# Patient Record
Sex: FEMALE | Race: WHITE | NOT HISPANIC OR LATINO
[De-identification: names, ages, dates, MRNs, and addresses within clinical notes are randomized per-mention and may not be internally consistent; named-entity substitution may affect disease eponyms.]

---

## 2017-02-07 ENCOUNTER — APPOINTMENT (OUTPATIENT)
Dept: INTERNAL MEDICINE | Facility: CLINIC | Age: 67
End: 2017-02-07

## 2017-08-07 ENCOUNTER — APPOINTMENT (OUTPATIENT)
Dept: INTERNAL MEDICINE | Facility: CLINIC | Age: 67
End: 2017-08-07
Payer: MEDICARE

## 2017-08-07 PROCEDURE — 99214 OFFICE O/P EST MOD 30 MIN: CPT | Mod: 25

## 2017-08-07 PROCEDURE — 90670 PCV13 VACCINE IM: CPT

## 2017-08-07 PROCEDURE — G0009: CPT

## 2017-11-07 ENCOUNTER — APPOINTMENT (OUTPATIENT)
Dept: INTERNAL MEDICINE | Facility: CLINIC | Age: 67
End: 2017-11-07

## 2017-12-27 ENCOUNTER — RECORD ABSTRACTING (OUTPATIENT)
Age: 67
End: 2017-12-27

## 2017-12-27 DIAGNOSIS — I10 ESSENTIAL (PRIMARY) HYPERTENSION: ICD-10-CM

## 2017-12-27 DIAGNOSIS — Z78.9 OTHER SPECIFIED HEALTH STATUS: ICD-10-CM

## 2017-12-27 DIAGNOSIS — E78.5 HYPERLIPIDEMIA, UNSPECIFIED: ICD-10-CM

## 2017-12-27 DIAGNOSIS — Z82.5 FAMILY HISTORY OF ASTHMA AND OTHER CHRONIC LOWER RESPIRATORY DISEASES: ICD-10-CM

## 2017-12-27 DIAGNOSIS — Z82.49 FAMILY HISTORY OF ISCHEMIC HEART DISEASE AND OTHER DISEASES OF THE CIRCULATORY SYSTEM: ICD-10-CM

## 2017-12-27 DIAGNOSIS — I38 ENDOCARDITIS, VALVE UNSPECIFIED: ICD-10-CM

## 2017-12-27 DIAGNOSIS — Z85.850 PERSONAL HISTORY OF MALIGNANT NEOPLASM OF THYROID: ICD-10-CM

## 2017-12-27 DIAGNOSIS — K66.0 PERITONEAL ADHESIONS (POSTPROCEDURAL) (POSTINFECTION): ICD-10-CM

## 2017-12-27 DIAGNOSIS — E66.9 OBESITY, UNSPECIFIED: ICD-10-CM

## 2017-12-27 RX ORDER — GABAPENTIN 600 MG/1
600 TABLET, FILM COATED ORAL
Refills: 0 | Status: ACTIVE | COMMUNITY

## 2017-12-27 RX ORDER — DULOXETINE HYDROCHLORIDE 60 MG/1
60 CAPSULE, DELAYED RELEASE ORAL
Refills: 0 | Status: ACTIVE | COMMUNITY

## 2017-12-27 RX ORDER — LEVOTHYROXINE SODIUM 0.17 MG/1
175 TABLET ORAL
Refills: 0 | Status: ACTIVE | COMMUNITY

## 2017-12-27 RX ORDER — ACETAMINOPHEN 325 MG/1
TABLET, FILM COATED ORAL
Refills: 0 | Status: ACTIVE | COMMUNITY

## 2017-12-27 RX ORDER — DIAPER,BRIEF,INFANT-TODD,DISP
EACH MISCELLANEOUS
Refills: 0 | Status: ACTIVE | COMMUNITY

## 2017-12-27 RX ORDER — AMLODIPINE BESYLATE 10 MG/1
10 TABLET ORAL
Refills: 0 | Status: ACTIVE | COMMUNITY

## 2017-12-27 RX ORDER — DENOSUMAB 60 MG/ML
INJECTION SUBCUTANEOUS
Refills: 0 | Status: ACTIVE | COMMUNITY

## 2017-12-27 RX ORDER — ALPRAZOLAM 2 MG/1
TABLET ORAL
Refills: 0 | Status: ACTIVE | COMMUNITY

## 2017-12-27 RX ORDER — DULOXETINE HYDROCHLORIDE 30 MG/1
30 CAPSULE, DELAYED RELEASE ORAL
Refills: 0 | Status: ACTIVE | COMMUNITY

## 2017-12-27 RX ORDER — GABAPENTIN 300 MG/1
300 CAPSULE ORAL
Refills: 0 | Status: ACTIVE | COMMUNITY

## 2018-01-10 ENCOUNTER — APPOINTMENT (OUTPATIENT)
Dept: INTERNAL MEDICINE | Facility: CLINIC | Age: 68
End: 2018-01-10
Payer: MEDICARE

## 2018-01-10 PROCEDURE — 90471 IMMUNIZATION ADMIN: CPT | Mod: GY

## 2018-01-10 PROCEDURE — 36415 COLL VENOUS BLD VENIPUNCTURE: CPT

## 2018-01-10 PROCEDURE — 90715 TDAP VACCINE 7 YRS/> IM: CPT | Mod: GY

## 2018-01-10 PROCEDURE — 99215 OFFICE O/P EST HI 40 MIN: CPT | Mod: 25

## 2018-01-23 ENCOUNTER — APPOINTMENT (OUTPATIENT)
Dept: CARDIOLOGY | Facility: CLINIC | Age: 68
End: 2018-01-23

## 2018-03-15 ENCOUNTER — RX RENEWAL (OUTPATIENT)
Age: 68
End: 2018-03-15

## 2018-03-15 ENCOUNTER — MEDICATION RENEWAL (OUTPATIENT)
Age: 68
End: 2018-03-15

## 2018-03-15 RX ORDER — ENALAPRIL MALEATE 20 MG/1
20 TABLET ORAL
Qty: 90 | Refills: 3 | Status: ACTIVE | COMMUNITY
Start: 1900-01-01 | End: 1900-01-01

## 2018-05-01 ENCOUNTER — RX RENEWAL (OUTPATIENT)
Age: 68
End: 2018-05-01

## 2018-05-01 RX ORDER — METOPROLOL SUCCINATE 200 MG/1
200 TABLET, EXTENDED RELEASE ORAL
Qty: 180 | Refills: 0 | Status: ACTIVE | COMMUNITY
Start: 2018-05-01 | End: 1900-01-01

## 2025-02-11 ENCOUNTER — EMERGENCY (EMERGENCY)
Facility: HOSPITAL | Age: 75
LOS: 1 days | Discharge: OTHER INSTITUTION | End: 2025-02-11
Attending: EMERGENCY MEDICINE
Payer: MEDICARE

## 2025-02-11 VITALS
SYSTOLIC BLOOD PRESSURE: 147 MMHG | OXYGEN SATURATION: 100 % | RESPIRATION RATE: 18 BRPM | HEART RATE: 75 BPM | TEMPERATURE: 98 F | DIASTOLIC BLOOD PRESSURE: 80 MMHG

## 2025-02-11 PROCEDURE — 99285 EMERGENCY DEPT VISIT HI MDM: CPT

## 2025-02-11 NOTE — ED PROVIDER NOTE - NSICDXPASTMEDICALHX_GEN_ALL_CORE_FT
PAST MEDICAL HISTORY:  Anxiety     Depression     Fibromyalgia     [place] (hard of hearing), bilateral     Hypertension     Hypothyroid     Osteopenia     PTSD (post-traumatic stress disorder)     Thyroid cancer

## 2025-02-11 NOTE — ED ADULT TRIAGE NOTE - CHIEF COMPLAINT QUOTE
Pt BIBEMS from [facility] for medical eval for [facility]. Pt is severely depressed and was recently at an outpatient place in [place], unhappy with car, was being registered at [facility] when eluded to bloody BMs 2 days ago. Pt endorses clear BMs and denies bloody. Pt endorses hemorrhoids.

## 2025-02-11 NOTE — ED PROVIDER NOTE - CLINICAL SUMMARY MEDICAL DECISION MAKING FREE TEXT BOX
patient presenting for medical clearance for [facility].  Has not had any bloody bowel movements in over 24 hours.  Patient asymptomatic in ED with stable vital signs.  Will obtain labs for clearance.  The patient remains asymptomatic with normal labs we will call [facility] () for possible admission.

## 2025-02-11 NOTE — ED PROVIDER NOTE - OBJECTIVE STATEMENT
74-year-old female h/o MDD, ANJELICA, hypothyroidism, Ménière's disease, hypertension, fibromyalgia, ambulates with a walker presenting from [facility] for medical clearance.  Patient recently admitted in [place] for anxiety/depression, presented to [facility] today for admission.  Told the staff at [facility] that she had diarrhea the last couple days that has resolved today, has a known history of hemorrhoids and had some blood in the stool 2 days ago.  Has not seen any blood since then.  Was sent to ED for medical clearance.  Denies fever, abdominal pain or additional symptoms.  States she has increasing depression and has had some suicidal ideation without any specific plan.

## 2025-02-11 NOTE — ED PROVIDER NOTE - NSICDXPASTSURGICALHX_GEN_ALL_CORE_FT
PAST SURGICAL HISTORY:  Family hx total abdominal hysterectomy/bilateral salpingo-oophorectomy 1989    History of appendectomy     History of arthroscopy of right knee 2009    History of carpal tunnel release left wrist 2005    History of laparoscopy 3 times for adhesions    History of thyroidectomy right side 1993 left 2013    History of total knee replacement, right 2010    S/P cholecystectomy 1996

## 2025-02-11 NOTE — ED PROVIDER NOTE - PROGRESS NOTE DETAILS
Jean Pierre AGUIRRE: Discussed with Dr. Muhammad at [facility], patient has not yet been accepted or admitted to [facility], states that patient will require psychiatric evaluation before being accepted to [facility].  Patient is medically cleared, I called telepsych for evaluation

## 2025-02-11 NOTE — ED PROVIDER NOTE - NSICDXFAMILYHX_GEN_ALL_CORE_FT
FAMILY HISTORY:  Father  Still living? No  Family history of COPD (chronic obstructive pulmonary disease), Age at diagnosis: Age Unknown    Mother  Still living? No  Family history of brain cancer, Age at diagnosis: Age Unknown

## 2025-02-12 LAB
ALBUMIN SERPL ELPH-MCNC: 4 G/DL — SIGNIFICANT CHANGE UP (ref 3.3–5.2)
ALP SERPL-CCNC: 94 U/L — SIGNIFICANT CHANGE UP (ref 40–120)
ALT FLD-CCNC: 18 U/L — SIGNIFICANT CHANGE UP
AMORPH CRY # UR COMP ASSIST: PRESENT
AMPHET UR-MCNC: NEGATIVE — SIGNIFICANT CHANGE UP
ANION GAP SERPL CALC-SCNC: 13 MMOL/L — SIGNIFICANT CHANGE UP (ref 5–17)
APAP SERPL-MCNC: <3 UG/ML — LOW (ref 10–26)
APPEARANCE UR: CLEAR — SIGNIFICANT CHANGE UP
AST SERPL-CCNC: 24 U/L — SIGNIFICANT CHANGE UP
BACTERIA # UR AUTO: ABNORMAL /HPF
BARBITURATES UR SCN-MCNC: NEGATIVE — SIGNIFICANT CHANGE UP
BENZODIAZ UR-MCNC: NEGATIVE — SIGNIFICANT CHANGE UP
BILIRUB SERPL-MCNC: 0.2 MG/DL — LOW (ref 0.4–2)
BILIRUB UR-MCNC: NEGATIVE — SIGNIFICANT CHANGE UP
BLD GP AB SCN SERPL QL: SIGNIFICANT CHANGE UP
BUN SERPL-MCNC: 26.1 MG/DL — HIGH (ref 8–20)
CALCIUM SERPL-MCNC: 9 MG/DL — SIGNIFICANT CHANGE UP (ref 8.4–10.5)
CAST: 5 /LPF — HIGH (ref 0–4)
CHLORIDE SERPL-SCNC: 101 MMOL/L — SIGNIFICANT CHANGE UP (ref 96–108)
CO2 SERPL-SCNC: 24 MMOL/L — SIGNIFICANT CHANGE UP (ref 22–29)
COCAINE METAB.OTHER UR-MCNC: NEGATIVE — SIGNIFICANT CHANGE UP
COD CRY URNS QL: PRESENT
COLOR SPEC: YELLOW — SIGNIFICANT CHANGE UP
CREAT SERPL-MCNC: 1.56 MG/DL — HIGH (ref 0.5–1.3)
DIFF PNL FLD: NEGATIVE — SIGNIFICANT CHANGE UP
EGFR: 35 ML/MIN/1.73M2 — LOW
ETHANOL SERPL-MCNC: <10 MG/DL — SIGNIFICANT CHANGE UP (ref 0–9)
FENTANYL UR QL SCN: NEGATIVE — SIGNIFICANT CHANGE UP
FLUAV AG NPH QL: SIGNIFICANT CHANGE UP
FLUBV AG NPH QL: SIGNIFICANT CHANGE UP
GLUCOSE SERPL-MCNC: 171 MG/DL — HIGH (ref 70–99)
GLUCOSE UR QL: NEGATIVE MG/DL — SIGNIFICANT CHANGE UP
HCT VFR BLD CALC: 44.7 % — SIGNIFICANT CHANGE UP (ref 34.5–45)
HGB BLD-MCNC: 14.8 G/DL — SIGNIFICANT CHANGE UP (ref 11.5–15.5)
KETONES UR-MCNC: ABNORMAL MG/DL
LEUKOCYTE ESTERASE UR-ACNC: NEGATIVE — SIGNIFICANT CHANGE UP
MCHC RBC-ENTMCNC: 27.8 PG — SIGNIFICANT CHANGE UP (ref 27–34)
MCHC RBC-ENTMCNC: 33.1 G/DL — SIGNIFICANT CHANGE UP (ref 32–36)
MCV RBC AUTO: 83.9 FL — SIGNIFICANT CHANGE UP (ref 80–100)
METHADONE UR-MCNC: NEGATIVE — SIGNIFICANT CHANGE UP
NITRITE UR-MCNC: NEGATIVE — SIGNIFICANT CHANGE UP
NRBC # BLD AUTO: 0 K/UL — SIGNIFICANT CHANGE UP (ref 0–0)
NRBC # FLD: 0 K/UL — SIGNIFICANT CHANGE UP (ref 0–0)
NRBC BLD AUTO-RTO: 0 /100 WBCS — SIGNIFICANT CHANGE UP (ref 0–0)
OPIATES UR-MCNC: NEGATIVE — SIGNIFICANT CHANGE UP
PCP SPEC-MCNC: SIGNIFICANT CHANGE UP
PCP UR-MCNC: NEGATIVE — SIGNIFICANT CHANGE UP
PH UR: 5.5 — SIGNIFICANT CHANGE UP (ref 5–8)
PLATELET # BLD AUTO: 328 K/UL — SIGNIFICANT CHANGE UP (ref 150–400)
PMV BLD: 10.6 FL — SIGNIFICANT CHANGE UP (ref 7–13)
POTASSIUM SERPL-MCNC: 3.2 MMOL/L — LOW (ref 3.5–5.3)
POTASSIUM SERPL-SCNC: 3.2 MMOL/L — LOW (ref 3.5–5.3)
PROT SERPL-MCNC: 6.8 G/DL — SIGNIFICANT CHANGE UP (ref 6.6–8.7)
PROT UR-MCNC: 30 MG/DL
RBC # BLD: 5.33 M/UL — HIGH (ref 3.8–5.2)
RBC # FLD: 14.1 % — SIGNIFICANT CHANGE UP (ref 10.3–14.5)
RBC CASTS # UR COMP ASSIST: 2 /HPF — SIGNIFICANT CHANGE UP (ref 0–4)
RSV RNA NPH QL NAA+NON-PROBE: SIGNIFICANT CHANGE UP
SALICYLATES SERPL-MCNC: <0.6 MG/DL — LOW (ref 10–20)
SARS-COV-2 RNA SPEC QL NAA+PROBE: SIGNIFICANT CHANGE UP
SODIUM SERPL-SCNC: 138 MMOL/L — SIGNIFICANT CHANGE UP (ref 135–145)
SP GR SPEC: 1.02 — SIGNIFICANT CHANGE UP (ref 1–1.03)
SQUAMOUS # UR AUTO: 5 /HPF — SIGNIFICANT CHANGE UP (ref 0–5)
THC UR QL: NEGATIVE — SIGNIFICANT CHANGE UP
UROBILINOGEN FLD QL: 1 MG/DL — SIGNIFICANT CHANGE UP (ref 0.2–1)
WBC # BLD: 13.86 K/UL — HIGH (ref 3.8–10.5)
WBC # FLD AUTO: 13.86 K/UL — HIGH (ref 3.8–10.5)
WBC UR QL: 1 /HPF — SIGNIFICANT CHANGE UP (ref 0–5)

## 2025-02-12 PROCEDURE — 99223 1ST HOSP IP/OBS HIGH 75: CPT

## 2025-02-12 PROCEDURE — 93010 ELECTROCARDIOGRAM REPORT: CPT | Mod: 76

## 2025-02-12 PROCEDURE — 99204 OFFICE O/P NEW MOD 45 MIN: CPT

## 2025-02-12 RX ORDER — GABAPENTIN 800 MG/1
100 TABLET ORAL THREE TIMES A DAY
Refills: 0 | Status: DISCONTINUED | OUTPATIENT
Start: 2025-02-12 | End: 2025-02-19

## 2025-02-12 RX ORDER — LEVOTHYROXINE SODIUM 25 UG/1
112 TABLET ORAL DAILY
Refills: 0 | Status: DISCONTINUED | OUTPATIENT
Start: 2025-02-12 | End: 2025-02-19

## 2025-02-12 RX ORDER — POTASSIUM CHLORIDE 750 MG/1
40 TABLET, EXTENDED RELEASE ORAL ONCE
Refills: 0 | Status: COMPLETED | OUTPATIENT
Start: 2025-02-12 | End: 2025-02-12

## 2025-02-12 RX ORDER — DULOXETINE 20 MG/1
60 CAPSULE, DELAYED RELEASE ORAL ONCE
Refills: 0 | Status: COMPLETED | OUTPATIENT
Start: 2025-02-12 | End: 2025-02-12

## 2025-02-12 RX ORDER — AMLODIPINE BESYLATE 5 MG
10 TABLET ORAL ONCE
Refills: 0 | Status: COMPLETED | OUTPATIENT
Start: 2025-02-12 | End: 2025-02-12

## 2025-02-12 RX ORDER — BACTERIOSTATIC SODIUM CHLORIDE 0.9 %
1000 VIAL (ML) INJECTION ONCE
Refills: 0 | Status: COMPLETED | OUTPATIENT
Start: 2025-02-12 | End: 2025-02-12

## 2025-02-12 RX ORDER — METOPROLOL SUCCINATE 25 MG
200 TABLET, EXTENDED RELEASE 24 HR ORAL AT BEDTIME
Refills: 0 | Status: DISCONTINUED | OUTPATIENT
Start: 2025-02-12 | End: 2025-02-19

## 2025-02-12 RX ORDER — DULOXETINE 20 MG/1
60 CAPSULE, DELAYED RELEASE ORAL
Refills: 0 | Status: DISCONTINUED | OUTPATIENT
Start: 2025-02-12 | End: 2025-02-19

## 2025-02-12 RX ADMIN — Medication 200 MILLIGRAM(S): at 23:09

## 2025-02-12 RX ADMIN — Medication 10 MILLIGRAM(S): at 20:54

## 2025-02-12 RX ADMIN — Medication 1000 MILLILITER(S): at 01:15

## 2025-02-12 RX ADMIN — LEVOTHYROXINE SODIUM 112 MICROGRAM(S): 25 TABLET ORAL at 06:39

## 2025-02-12 RX ADMIN — DULOXETINE 60 MILLIGRAM(S): 20 CAPSULE, DELAYED RELEASE ORAL at 20:56

## 2025-02-12 RX ADMIN — Medication 25 MILLIGRAM(S): at 20:55

## 2025-02-12 RX ADMIN — GABAPENTIN 100 MILLIGRAM(S): 800 TABLET ORAL at 23:09

## 2025-02-12 RX ADMIN — Medication 25 MILLIGRAM(S): at 11:17

## 2025-02-12 RX ADMIN — POTASSIUM CHLORIDE 40 MILLIEQUIVALENT(S): 750 TABLET, EXTENDED RELEASE ORAL at 01:15

## 2025-02-12 NOTE — OCCUPATIONAL THERAPY INITIAL EVALUATION ADULT - ADDITIONAL COMMENTS
right handed  Pt uses a rollator  Pt stated she is able to complete ADLs with increased time secondary to Meniere's disease- reported she has adapted to the symptoms

## 2025-02-12 NOTE — ED ADULT NURSE NOTE - NSICDXPASTMEDICALHX_GEN_ALL_CORE_FT
PAST MEDICAL HISTORY:  Anxiety     Depression     Fibromyalgia     [place] (hard of hearing), bilateral     Hypertension     Hypothyroid     Osteopenia     PTSD (post-traumatic stress disorder)     Thyroid cancer

## 2025-02-12 NOTE — ED ADULT NURSE NOTE - NSFALLRISKINTERV_ED_ALL_ED
Assistance OOB with selected safe patient handling equipment if applicable/Assistance with ambulation/Communicate fall risk and risk factors to all staff, patient, and family/Monitor gait and stability/Provide visual cue: yellow wristband, yellow gown, etc/Reinforce activity limits and safety measures with patient and family/Call bell, personal items and telephone in reach/Instruct patient to call for assistance before getting out of bed/chair/stretcher/Non-slip footwear applied when patient is off stretcher/[place] to call system/Physically safe environment - no spills, clutter or unnecessary equipment/Purposeful Proactive Rounding/Room/bathroom lighting operational, light cord in reach

## 2025-02-12 NOTE — ED ADULT NURSE NOTE - CHIEF COMPLAINT QUOTE
Pt BIBEMS from [facility] for medical eval for [facility]. Pt is severely depressed and was recently at an outpatient place in [place], unhappy with car, was being registered at [facility] when eluded to bloody BMs 2 days ago. Pt endorses clear BMs and denies bloody. Pt endorses hemorrhoids.

## 2025-02-12 NOTE — ED BEHAVIORAL HEALTH NOTE - BEHAVIORAL HEALTH NOTE
SW Note: Met with pt to discuss tx recommendation and plan to transfer pt for IPP care. pt resting in bed. Pleasant and cooperative. Pt stated she was discharged approx 1 week ago from [facility] substance abuse [facility]hab in [place]. pt took an urber and has been staying in a hotel. Pt is homeless and has been for at least past several months. In November 2024 pt was admitted to [facility] for [facility] at discharge she stated she did not tell them she was homeless and uses her  address for d/c. Pt uses private funds and stayed in a hotel till she called her " pt cant recall who" and was referred to the [place] IP program. Pt stated minimized any substance abuse issues but stated she was treated for xanax use. Reports no ETOH abuse.   pt is not connected to a MH provider in the community  Pt is seeking IPP for depression. Discussed NW transfer process and that all NW facilities  are outreached for available beds.  ADL/physical needs per pt: Pt has a hearing aid in L ear and implant in R/ wears a velcro brace around L wrist and hand. Metal can be removed. Reports she has been wearing brace since 2005 and needs it for support. Pt uses a rollator ( with her) and a shower chair. Reports she is independent with ADL's. Cant negotiate stairs.   Info sent to telepsych team for bed search. SW will follow  Notified ED provider UA and EKG needed

## 2025-02-12 NOTE — ED CDU PROVIDER INITIAL DAY NOTE - OBJECTIVE STATEMENT
74-year-old female h/o MDD, ANJELICA, hypothyroidism, Ménière's disease, hypertension, fibromyalgia, ambulates with a walker presenting from [facility] for medical clearance.  Patient recently admitted in [place] for anxiety/depression, presented to [facility] today for admission.  Told the staff at [facility] that she had diarrhea the last couple days that has resolved today, has a known history of hemorrhoids and had some blood in the stool 2 days ago.  Has not seen any blood since then.  Was sent to ED for medical clearance.  Denies fever, abdominal pain or additional symptoms.  States she has increasing depression and has had some suicidal ideation without any specific plan.

## 2025-02-12 NOTE — PROVIDER CONTACT NOTE (OTHER) - ASSESSMENT
PT ordered. chart reviewed and noted. pt received in ED, semifowler position in stretcher, agreeable to PT. pt tolerated session well. pt left as received, 0/10 pain throughout, CNA present, Call bell within reach, discussion c CCC, left wrist splint

## 2025-02-12 NOTE — ED BEHAVIORAL HEALTH PROGRESS NOTE - RISK ASSESSMENT
The pt presents with recurrent acute mood symptoms in the setting of stress tied to her recent inpatient discharge, including increased irritability, anxiety, mood lability, and SI with method, currently states she does not feel safe outside the hospital due to concerns she will act on her SI when it recurs, and is unable to safety plan. As such, the pt presents as an imminent risk of harm to self given these aforementioned factors and warrants inpatient psychiatric hospitalization for safety, psychiatric stabilization, and appropriate aftercare planning.

## 2025-02-12 NOTE — ED BEHAVIORAL HEALTH ASSESSMENT NOTE - PSYCHIATRIC ISSUES AND PLAN (INCLUDE STANDING AND PRN MEDICATION)
Recommend pt c/w home cymbalta 60 mg BID. For agitation, can offer PO Quetiapine 25 mg Q6H PRN. Hold antipsychotics if QTC>500

## 2025-02-12 NOTE — ED BEHAVIORAL HEALTH ASSESSMENT NOTE - DIFFERENTIAL
MDD, recurrent episode vs Unspecified depressive disorder vs Adjustment Disorder  Cluster B traits vs PD  Hx of PTSD  Hx of ANJELICA

## 2025-02-12 NOTE — ED ADULT NURSE REASSESSMENT NOTE - NS ED NURSE REASSESS COMMENT FT1
Pt resting in bed comfortably. No complaints at this time. NAD. RR even and unlabored. Pt updated on the plan of care and verbalizes understanding.

## 2025-02-12 NOTE — PROVIDER CONTACT NOTE (OTHER) - RECOMMENDATIONS
D/C recommendation: inpatient facility c rollator D/C recommendation: inpatient psych facility c rollator

## 2025-02-12 NOTE — OCCUPATIONAL THERAPY INITIAL EVALUATION ADULT - RANGE OF MOTION EXAMINATION, UPPER EXTREMITY
Pt uses a wrist brace due to prior injuries/bilateral UE Active ROM was WFL  (within functional limits)

## 2025-02-12 NOTE — ED ADULT NURSE REASSESSMENT NOTE - NS ED NURSE REASSESS COMMENT FT1
1:1 observation initiated due to SI and concepts of a plan stated to RN. when asked if pt had a plan she stated " yes im going to take a bunch of pills" MD richards notified, pt changed into a yellow gown and belongings secured with security. Pt is AOx3 NAD noted, pt resting comfortably in bed, side rails up and wheels locked. Pt breathing even and unlabored, pt denies any CP, SOB or HA. Pt is awaiting telepsych consult POC explained, pt verbalized understanding and has no acute complaints at this time. 1:1 remains at bedside, pt is not complaining of any pain and does not appear to be in any distress, pt offered toileting and nutrition.

## 2025-02-12 NOTE — ED BEHAVIORAL HEALTH ASSESSMENT NOTE - HPI (INCLUDE ILLNESS QUALITY, SEVERITY, DURATION, TIMING, CONTEXT, MODIFYING FACTORS, ASSOCIATED SIGNS AND SYMPTOMS)
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, PMH significant for hypothyroidism, Ménière's disease, HTN, fibromyalgia, ambulates with a walker, with PPHx of MDD, ANJELICA, 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, , domiciled alone, retired nurse, non-caregiver, PMH significant for hypothyroidism, Ménière's disease, HTN, fibromyalgia, macular degeneration, ambulates with a rollator, wears brace on L forearm s/p wrist fracture/dislocation, with PPHx of MDD, PTSD, ANJELICA, prior psych admissions, was last admitted to inpatient unit in [place]([facility]) and was discharged last week, denies pSA/NSSIB, denies physical aggression, no active substance use, on cymbatla 60 mg BID, who initially presented to [facility] seeking admission for treatment of depression and was referred to the ED for an evaluation.     On assessment, the pt presents as hyperverbal, slightly irritable, and circumstantial, and states she came to the ED with the hope to be admitted because she currently does not "trust" herself to be alone outside the hospital due to recent SI with thoughts to overdose on her meds. The pt states she was admitted for treatment of depression at a facility called [facility] in [place] last month for 25 days. She states her mood symptoms gradually improved, but when she was unable to be transferred to PHP due to ?insurance issues she was discharged. The pt states since leaving the hospital she' felt more irritable, hopeless, anxious, and depressed, and 2-3 days ago she was close to overdosing on her medications to end her life, which prompted her to come to the hospital. The pt states she's currently afraid to be on her own outside the hospital because she doesn't trust herself not to act on her SI, which she expects to recur. The pt otherwise does not report current or recent HI, A/VH, or PI, recent aggression, SIB, or substance use, or access to firearms.

## 2025-02-12 NOTE — ED BEHAVIORAL HEALTH ASSESSMENT NOTE - SUMMARY
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, , domiciled alone, retired nurse, non-caregiver, PMH significant for hypothyroidism, Ménière's disease, HTN, fibromyalgia, macular degeneration, ambulates with a rollator, wears brace on L forearm s/p wrist fracture/dislocation, with PPHx of MDD, PTSD, ANJELICA, prior psych admissions, was last admitted to inpatient unit in [place]([facility]) and was discharged last week, denies pSA/NSSIB, denies physical aggression, no active substance use, on cymbalta 60 mg BID, who initially presented to [facility] seeking admission for treatment of depression and was referred to the ED for an evaluation.     The pt presents with recurrent acute mood symptoms in the setting of stress tied to her recent inpatient discharge, including increased irritability, anxiety, mood lability, and SI with method, currently states she does not feel safe outside the hospital due to concerns she will act on her SI when it recurs, and is unable to safety plan. As such, the pt presents as an imminent risk of harm to self given these aforementioned factors and warrants inpatient psychiatric hospitalization for safety, psychiatric stabilization, and appropriate aftercare planning.

## 2025-02-12 NOTE — OCCUPATIONAL THERAPY INITIAL EVALUATION ADULT - PERTINENT HX OF CURRENT PROBLEM, REHAB EVAL
As per MD note:  74-year-old female h/o MDD, ANJELICA, hypothyroidism, Ménière's disease, hypertension, fibromyalgia, ambulates with a walker presenting from [facility] for medical clearance.  Patient recently admitted in [place] for anxiety/depression, presented to [facility] today for admission.  Told the staff at [facility] that she had diarrhea the last couple days that has resolved today, has a known history of hemorrhoids and had some blood in the stool 2 days ago.  Has not seen any blood since then.  Was sent to ED for medical clearance.  Denies fever, abdominal pain or additional symptoms.  States she has increasing depression and has had some suicidal ideation without any specific plan.

## 2025-02-12 NOTE — ED ADULT NURSE REASSESSMENT NOTE - NS ED NURSE REASSESS COMMENT FT1
Received pt from Anusha MCGREGOR. pt sitting quietly in stretcher NAD noted at this time. No new orders at this time pt safety ongoing.

## 2025-02-12 NOTE — ED ADULT NURSE REASSESSMENT NOTE - NS ED NURSE REASSESS COMMENT FT1
Report received from Wil MCGREGOR. Pt resting in bed comfortably. Pt requesting meds at this time, MD aware. Pt updated on the plan of care and verbalizes understanding.

## 2025-02-12 NOTE — ED CDU PROVIDER INITIAL DAY NOTE - NSICDXPASTMEDICALHX_GEN_ALL_CORE_FT
PAST MEDICAL HISTORY:  Anxiety     Depression     Fibromyalgia     [place] (hard of hearing), bilateral     Hypertension     Hypothyroid     Osteopenia     PTSD (post-traumatic stress disorder)     Thyroid cancer

## 2025-02-12 NOTE — ED ADULT NURSE NOTE - OBJECTIVE STATEMENT
Pt is a 73yo female who presents to the ED with the complaints of depression / medical clearance. Pt states she began to become very depressed over the past

## 2025-02-12 NOTE — ED BEHAVIORAL HEALTH ASSESSMENT NOTE - DETAILS
Documented allergy to aspirin, betadine, demerol hcl, ferrous sulfate, penicillins Deferred NA Reports hx of LE swelling/rash when on Buspar. Has documented allergy to aspirin, betadine, demerol hcl, ferrous sulfate, penicillins See HPI TBD

## 2025-02-13 ENCOUNTER — RESULT REVIEW (OUTPATIENT)
Age: 75
End: 2025-02-13

## 2025-02-13 VITALS
OXYGEN SATURATION: 92 % | RESPIRATION RATE: 18 BRPM | SYSTOLIC BLOOD PRESSURE: 127 MMHG | DIASTOLIC BLOOD PRESSURE: 74 MMHG | TEMPERATURE: 99 F | HEART RATE: 65 BPM

## 2025-02-13 DIAGNOSIS — R94.31 ABNORMAL ELECTROCARDIOGRAM [ECG] [EKG]: ICD-10-CM

## 2025-02-13 PROCEDURE — 99283 EMERGENCY DEPT VISIT LOW MDM: CPT | Mod: 25

## 2025-02-13 PROCEDURE — 93306 TTE W/DOPPLER COMPLETE: CPT | Mod: 26

## 2025-02-13 PROCEDURE — 93306 TTE W/DOPPLER COMPLETE: CPT

## 2025-02-13 PROCEDURE — G0378: CPT

## 2025-02-13 PROCEDURE — 93005 ELECTROCARDIOGRAM TRACING: CPT

## 2025-02-13 PROCEDURE — 87637 SARSCOV2&INF A&B&RSV AMP PRB: CPT

## 2025-02-13 PROCEDURE — 80307 DRUG TEST PRSMV CHEM ANLYZR: CPT

## 2025-02-13 PROCEDURE — 99233 SBSQ HOSP IP/OBS HIGH 50: CPT

## 2025-02-13 PROCEDURE — 85027 COMPLETE CBC AUTOMATED: CPT

## 2025-02-13 PROCEDURE — 96374 THER/PROPH/DIAG INJ IV PUSH: CPT | Mod: XU

## 2025-02-13 PROCEDURE — 99214 OFFICE O/P EST MOD 30 MIN: CPT

## 2025-02-13 PROCEDURE — 81001 URINALYSIS AUTO W/SCOPE: CPT

## 2025-02-13 PROCEDURE — 86901 BLOOD TYPING SEROLOGIC RH(D): CPT

## 2025-02-13 PROCEDURE — 86850 RBC ANTIBODY SCREEN: CPT

## 2025-02-13 PROCEDURE — 86900 BLOOD TYPING SEROLOGIC ABO: CPT

## 2025-02-13 PROCEDURE — 80053 COMPREHEN METABOLIC PANEL: CPT

## 2025-02-13 PROCEDURE — 93010 ELECTROCARDIOGRAM REPORT: CPT | Mod: 76

## 2025-02-13 PROCEDURE — 97167 OT EVAL HIGH COMPLEX 60 MIN: CPT

## 2025-02-13 PROCEDURE — 36415 COLL VENOUS BLD VENIPUNCTURE: CPT

## 2025-02-13 PROCEDURE — 99285 EMERGENCY DEPT VISIT HI MDM: CPT

## 2025-02-13 RX ORDER — MAGNESIUM SULFATE 0.8 MEQ/ML
2 AMPUL (ML) INJECTION ONCE
Refills: 0 | Status: COMPLETED | OUTPATIENT
Start: 2025-02-13 | End: 2025-02-13

## 2025-02-13 RX ORDER — POTASSIUM CHLORIDE 750 MG/1
40 TABLET, EXTENDED RELEASE ORAL ONCE
Refills: 0 | Status: COMPLETED | OUTPATIENT
Start: 2025-02-13 | End: 2025-02-13

## 2025-02-13 RX ADMIN — Medication 40 MILLIGRAM(S): at 06:20

## 2025-02-13 RX ADMIN — Medication 25 MILLIGRAM(S): at 06:19

## 2025-02-13 RX ADMIN — POTASSIUM CHLORIDE 40 MILLIEQUIVALENT(S): 750 TABLET, EXTENDED RELEASE ORAL at 04:40

## 2025-02-13 RX ADMIN — Medication 200 MILLIGRAM(S): at 22:53

## 2025-02-13 RX ADMIN — GABAPENTIN 100 MILLIGRAM(S): 800 TABLET ORAL at 06:20

## 2025-02-13 RX ADMIN — GABAPENTIN 100 MILLIGRAM(S): 800 TABLET ORAL at 22:52

## 2025-02-13 RX ADMIN — LEVOTHYROXINE SODIUM 112 MICROGRAM(S): 25 TABLET ORAL at 05:42

## 2025-02-13 RX ADMIN — DULOXETINE 60 MILLIGRAM(S): 20 CAPSULE, DELAYED RELEASE ORAL at 22:52

## 2025-02-13 RX ADMIN — Medication 25 GRAM(S): at 04:40

## 2025-02-13 RX ADMIN — Medication 25 MILLIGRAM(S): at 22:53

## 2025-02-13 RX ADMIN — DULOXETINE 60 MILLIGRAM(S): 20 CAPSULE, DELAYED RELEASE ORAL at 06:19

## 2025-02-13 NOTE — ED ADULT NURSE REASSESSMENT NOTE - NS ED NURSE REASSESS COMMENT FT1
Report received from BALBIR RON, PT is awake, with a patent and self maintained airway, with non labored breathing. PT remains on constant OBS.

## 2025-02-13 NOTE — ED BEHAVIORAL HEALTH PROGRESS NOTE - SUMMARY
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, , domiciled alone, retired nurse, non-caregiver, PMH significant for hypothyroidism, Ménière's disease, HTN, fibromyalgia, macular degeneration, ambulates with a rollator, wears brace on L forearm s/p wrist fracture/dislocation, with PPHx of MDD, PTSD, ANJELICA, prior psych admissions, was last admitted to inpatient unit in [place]([facility]) and was discharged last week, denies pSA/NSSIB, denies physical aggression, no active substance use, on cymbalta 60 mg BID, who initially presented to [facility] seeking admission for treatment of depression and was referred to the ED for an evaluation. 
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, , domiciled alone, retired nurse, non-caregiver, PMH significant for hypothyroidism, Ménière's disease, HTN, fibromyalgia, macular degeneration, ambulates with a rollator, wears brace on L forearm s/p wrist fracture/dislocation, with PPHx of MDD, PTSD, ANJELICA, prior psych admissions, was last admitted to inpatient unit in [place]([facility]) and was discharged last week, denies pSA/NSSIB, denies physical aggression, no active substance use, on cymbalta 60 mg BID, who initially presented to [facility] seeking admission for treatment of depression and was referred to the ED for an evaluation.

## 2025-02-13 NOTE — ED BEHAVIORAL HEALTH PROGRESS NOTE - STANDING MEDS RECEIVED IN ED DETAILS FREE TEXT
MEDICATIONS  (STANDING):  amLODIPine   Tablet 10 milliGRAM(s) Oral once  DULoxetine 60 milliGRAM(s) Oral two times a day  DULoxetine 60 milliGRAM(s) Oral once  gabapentin 100 milliGRAM(s) Oral three times a day  levothyroxine 112 MICROGram(s) Oral daily  lisinopril 40 milliGRAM(s) Oral daily  meclizine 25 milliGRAM(s) Oral daily  
MEDICATIONS  (STANDING):  DULoxetine 60 milliGRAM(s) Oral two times a day  gabapentin 100 milliGRAM(s) Oral three times a day  levothyroxine 112 MICROGram(s) Oral daily  lisinopril 40 milliGRAM(s) Oral daily  meclizine 25 milliGRAM(s) Oral two times a day  metoprolol succinate  milliGRAM(s) Oral at bedtime

## 2025-02-13 NOTE — ED BEHAVIORAL HEALTH PROGRESS NOTE - PSYCHIATRIC ISSUES AND PLAN (INCLUDE STANDING AND PRN MEDICATION)
continue constant observation and home duloxetine 60 mg bid
continue constant observation and home duloxetine 60 mg bid

## 2025-02-13 NOTE — ED BEHAVIORAL HEALTH PROGRESS NOTE - NSBHMSESPEECH_PSY_A_CORE
Noted
Normal volume, rate, productivity, spontaneity and articulation
Normal volume, rate, productivity, spontaneity and articulation

## 2025-02-13 NOTE — CONSULT NOTE ADULT - ASSESSMENT
74-year-old female h/o MDD, ANJELICA, hypothyroidism, Ménière's disease, hypertension, fibromyalgia, ambulates with a walker presenting from [facility] for medical clearance.  Patient recently admitted in [place] for anxiety/depression, presented to [facility] on 2/11 for admission but found with bloody stools which have since resolved. Cardiology consulted for EKG with prolonged QT. Patient denied any anginal symptoms or complaints. Denies chest pain, back pain, headache, dizziness, SOB, RAMOS, diaphoresis, syncope or N/V.

## 2025-02-13 NOTE — CONSULT NOTE ADULT - SUBJECTIVE AND OBJECTIVE BOX
[facility]                                              CARDIOLOGY AT [address], [place] [address]                                             Telephone: [phone]. Fax:[phone]                                                       CARDIOLOGY CONSULTATION NOTE                                                                                             History obtained by: Patient and medical record  Community Cardiologist: Dr. Brennen Jones   obtained: Yes [  ] No [ x ]  Reason for Consultation: abnormal EKG  Available out pt records reviewed: Yes [ x ] No [  ]    Chief complaint:    Patient is a 74y old  Female who presents with a chief complaint of     HPI:  74-year-old female h/o MDD, ANJELICA, hypothyroidism, Ménière's disease, hypertension, fibromyalgia, ambulates with a walker presenting from [facility] for medical clearance.  Patient recently admitted in [place] for anxiety/depression, presented to [facility] on  for admission but found with bloody stools which have since resolved. Cardiology consulted for EKG with prolonged QT. Patient denied any anginal symptoms or complaints. Denies chest pain, back pain, headache, dizziness, SOB, RAMOS, diaphoresis, syncope or N/V.      CARDIAC TESTING   ECHO: PENDING    STRESS:    CATH:     ELECTROPHYSIOLOGY:     PAST MEDICAL HISTORY  Hypertension  Hypothyroid  Fibromyalgia  PTSD (post-traumatic stress disorder)  Depression  Thyroid cancer  Anxiety  [place] (hard of hearing), bilateral  Osteopenia        PAST SURGICAL HISTORY  History of thyroidectomy  Family hx total abdominal hysterectomy/bilateral salpingo-oophorectomy  History of appendectomy  History of laparoscopy  S/P cholecystectomy  History of carpal tunnel release  History of total knee replacement, right  History of arthroscopy of right knee      SOCIAL HISTORY:  Denies smoking/alcohol/drugs  CIGARETTES:     ALCOHOL:  DRUGS:    FAMILY HISTORY:  Family history of COPD (chronic obstructive pulmonary disease) (Father)  Family history of brain cancer (Mother)      Family History of Cardiovascular Disease:  Yes [  ] No [x]  Coronary Artery Disease in first degree relative: Yes [  ] No [x ]  Sudden Cardiac Death in First degree relative: Yes [  ] No [x ]    HOME MEDICATIONS:  amLODIPine 10 mg oral tablet: 1 tab(s) orally once a day (26 Oct 2014 08:58)  Calcium 600+D 600 mg-200 units oral tablet:  orally once a day (26 Oct 2014 08:58)  enalapril 20 mg oral tablet: 1 tab(s) orally 2 times a day (26 Oct 2014 08:58)  enoxaparin: 30 milligram(s) subcutaneous 2 times a day (28 Oct 2014 08:00)  multivitamin:   once a day (26 Oct 2014 08:58)  oxyCODONE 5 mg oral tablet: 1 tab(s) orally every 4 hours, As needed, Mild Pain (26 Oct 2014 08:58)  Synthroid 150 mcg (0.15 mg) oral tablet: 1 tab(s) orally once a day (26 Oct 2014 08:58)  Toprol- mg oral tablet, extended release:  orally 2 times a day (26 Oct 2014 08:58)  Vitamin D3 1000 intl units oral capsule: 1 cap(s) orally once a day (26 Oct 2014 08:58)  Xanax 0.5 mg oral tablet:  orally once a day (26 Oct 2014 08:58)      CURRENT CARDIAC MEDICATIONS:  lisinopril 40 milliGRAM(s) Oral daily  metoprolol succinate  milliGRAM(s) Oral at bedtime      CURRENT OTHER MEDICATIONS:  DULoxetine 60 milliGRAM(s) Oral two times a day  gabapentin 100 milliGRAM(s) Oral three times a day  meclizine 25 milliGRAM(s) Oral two times a day  levothyroxine 112 MICROGram(s) Oral daily      ALLERGIES:   adhesives (Blisters; Rash)  penicillins (Stomach Upset; Vomiting)  Demerol HCl (Stomach Upset)  latex (Headache; Rash)  Tin-Bart (Rash)  shellfish (Hives)  IV Contrast (Hives)  Betadine (Blisters; Rash)  aspirin (Rash)  ferrous sulfate (Hives)  tuberculin purified protein derivative (Hives)      REVIEW OF SYMPTOMS:   CONSTITUTIONAL: No fever, no chills, no weight loss, no weight gain, no fatigue   ENMT:  No vertigo; No sinus or throat pain  NECK: No pain or stiffness  CARDIOVASCULAR: No chest pain, no dyspnea, no syncope/presyncope, no palpitations, no dizziness, no Orthopnea, no Paroxsymal nocturnal dyspnea  RESPIRATORY: no Shortness of breath, no cough, no wheezing  : No dysuria, no hematuria   GI: No dark color stool, no nausea, no diarrhea, no constipation, no abdominal pain   NEURO: No headache, no slurred speech   MUSCULOSKELETAL: No joint pain or swelling; No muscle, back, or extremity pain  PSYCH: No agitation, no anxiety.    ALL OTHER REVIEW OF SYSTEMS ARE NEGATIVE.    VITAL SIGNS:  T(C): 36.7 (25 @ 07:29), Max: 36.9 (25 @ 12:14)  T(F): 98.1 (25 @ 07:29), Max: 98.4 (25 @ 12:14)  HR: 62 (25 @ 07:29) (62 - 83)  BP: 133/68 (25 @ 07:29) (133/68 - 155/79)  RR: 18 (25 @ 07:29) (16 - 18)  SpO2: 93% (25 @ 07:29) (93% - 97%)    INTAKE AND OUTPUT:       PHYSICAL EXAM:  Constitutional: Comfortable . No acute distress.   HEENT: Atraumatic and normocephalic , neck is supple . no JVD. No carotid bruit.  CNS: A&Ox3. No focal deficits.   Respiratory: CTAB, unlabored   Cardiovascular: RRR normal s1 s2. No murmur. No rubs or gallop.  Gastrointestinal: Soft, non-tender. +Bowel sounds.   Extremities: 2+ Peripheral Pulses, No clubbing, cyanosis, or edema  Psychiatric: Calm . no agitation.   Skin: Warm and dry, no ulcers on extremities     LABS:                            14.8   13.86 )-----------( 328      ( 2025 23:51 )             44.7     02-    138  |  101  |  26.1[H]  ----------------------------<  171[H]  3.2[L]   |  24.0  |  1.56[H]    Ca    9.0      2025 23:51    TPro  6.8  /  Alb  4.0  /  TBili  0.2[L]  /  DBili  x   /  AST  24  /  ALT  18  /  AlkPhos  94  02-11      Urinalysis Basic - ( 2025 14:45 )    Color: Yellow / Appearance: Clear / S.020 / pH: x  Gluc: x / Ketone: Trace mg/dL  / Bili: Negative / Urobili: 1.0 mg/dL   Blood: x / Protein: 30 mg/dL / Nitrite: Negative   Leuk Esterase: Negative / RBC: 2 /HPF / WBC 1 /HPF   Sq Epi: x / Non Sq Epi: 5 /HPF / Bacteria: Occasional /HPF              INTERPRETATION OF TELEMETRY:     ECG: NSR no ischemic changes, QTc 507  Prior ECG: Yes [  ] No [  ]    RADIOLOGY & ADDITIONAL STUDIES:    X-ray:    CT scan:   MRI:   US:

## 2025-02-13 NOTE — ED ADULT NURSE REASSESSMENT NOTE - NS ED NURSE REASSESS COMMENT FT1
Pt resting in bed comfortably. Medicated with morning meds. NAD. RR even and unlabored. Pt updated on the plan of care and verbalizes understanding.

## 2025-02-13 NOTE — ED BEHAVIORAL HEALTH PROGRESS NOTE - RISK ASSESSMENT
The pt presents with recurrent acute mood symptoms in the setting of stress tied to her recent inpatient discharge, including increased irritability, anxiety, mood lability, and SI with method, currently states she does not feel safe outside the hospital due to concerns she will act on her SI when it recurs, and is unable to safety plan. As such, the pt presents as an imminent risk of harm to self given these aforementioned factors and warrants inpatient psychiatric hospitalization for safety, psychiatric stabilization, and appropriate aftercare planning. The pt presents with recurrent acute mood symptoms in the setting of stress tied to her recent inpatient discharge, including increased irritability, anxiety, mood lability, and SI with method, currently states she does not feel safe outside the hospital due to concerns she will act on her SI when it recurs, and is unable to safety plan. As such, the pt presents as an imminent risk of harm to self given these aforementioned factors and warrants inpatient psychiatric hospitalization for safety, psychiatric stabilization, and appropriate aftercare planning.    patient being converted to involuntary status given that patient is at elevated risk of harm and now declining transport upon their arrival.

## 2025-02-13 NOTE — ED ADULT NURSE REASSESSMENT NOTE - NS ED NURSE REASSESS COMMENT FT1
Pt care assumed @ this time. Pt resting in bed. VSS. Resp even and unlabored. Awaiting further plan of care at this time. Pt updated on plan of care and verbalizes understanding. Yellow gown on 1:1 in place Belongings secured.

## 2025-02-13 NOTE — CONSULT NOTE ADULT - NS ATTEND AMEND GEN_ALL_CORE FT
-    	  74F hx MDD, ANJELICA, hypothyroidism, Ménière's disease, hypertension, fibromyalgia, ambulates with a walker presenting from [facility] for medical clearance.  Patient recently admitted in [place] for anxiety/depression, presented to [facility] on 2/11 for admission but found with bloody stools which have since resolved. Cardiology consulted for EKG with prolonged QT. Patient denied any anginal symptoms or complaints.     Abnormal EKG.   - called for EKG with QTC prolonged, around 500-507  - denied cardiac symptoms  - TTE done: no significant abnormality  - patient is on medication that prolong QT including gabapentin,   - No contraindication to transfer to [facility]  - No further inpatient cardiac work up at this time.  Will sign off.  Please reconsult if needed.

## 2025-02-13 NOTE — CONSULT NOTE ADULT - PROBLEM SELECTOR RECOMMENDATION 9
.  - called for EKG with QTC prolonged, around 500-507  - no s/s of arrythmia, Denies chest pain, back pain, headache, dizziness, SOB, RAMOS, diaphoresis, syncope or N/V.  - has not seen OP cardiologist in sometime  - pending TTE for evaluation of cardiac function and any valvular abnormalities  - patient is on medication that prolong QT including gabapentin,   - if TTE without significant abnormality, then no contraindication to transfer to [facility].  - if TTE abnormal, will discuss further evaluation with patient .  - called for EKG with QTC prolonged, around 500-507  - no s/s of arrythmia, Denies chest pain, back pain, headache, dizziness, SOB, RAMOS, diaphoresis, syncope or N/V.  - has not seen OP cardiologist in sometime  - pending TTE for evaluation of cardiac function and any valvular abnormalities  - patient is on medication that prolong QT including gabapentin,   - if TTE without significant abnormality, then no contraindication to transfer to [facility].  - if TTE abnormal, will discuss further evaluation with patient    ***ADDENDUM 1434***  - TTE without significant abnormality  - No contraindication to transfer to [facility]  - No further inpatient cardiac work up at this time.  Will sign off.  Please reconsult if needed.

## 2025-02-13 NOTE — ED CDU PROVIDER SUBSEQUENT DAY NOTE - NSICDXPASTMEDICALHX_GEN_ALL_CORE_FT
PAST MEDICAL HISTORY:  Anxiety     Depression     Fibromyalgia     [place] (hard of hearing), bilateral     Hypertension     Hypothyroid     Osteopenia     PTSD (post-traumatic stress disorder)     Thyroid cancer

## 2025-02-13 NOTE — ED BEHAVIORAL HEALTH PROGRESS NOTE - DETAILS:
Evaluated for follow up. received while resting in bed, awake and in no acute distress.  no significiant behavioral issues. appears to be engaged and pleasant today. states that often times due to her age she is at a point in her life where she values being able to exert "control" when she can ie her medication regimen and her medical care. did not express any si/hi/avh with writer.
Evaluated for follow up. received while resting in bed. reportedly no significant behavioral issues. irritable with writer as patient focused on discussing his anti hypertensives though did endorse worsening depression. does not elaborate if any si/hi/avh.

## 2025-02-13 NOTE — ED BEHAVIORAL HEALTH NOTE - BEHAVIORAL HEALTH NOTE
brief psychiatry note    writer notified that patient around 1900 declining ems transport for her 9.13 voluntary transfer to [facility]  writer spoke with patient attempting to provide guidance and recommendations however patient notably dysregulated emotional highly irritable, unable to contract for safety or safety plan  given the above as well as recent concerns for suicidal thoughts and endorsing that she feels as if she will act on them, team will proceed with involuntary commitment  spoke with khushbu and primary team  sw notified [facility] staff as well

## 2025-02-13 NOTE — ED ADULT NURSE REASSESSMENT NOTE - NS ED NURSE REASSESS COMMENT FT1
Assumed care of patient at approx 15:30pm. Patient AxOx4. Patient denies pain/discomfort at this time. Patient has been updated on the plan of care. Patient offers no complaints at this time. No visible signs of acute distress noted, Patient on 1:1 for safety.

## 2025-02-13 NOTE — ED BEHAVIORAL HEALTH NOTE - BEHAVIORAL HEALTH NOTE
JESSICA Note: JESSICA made aware by ED RN that pt is refusing to take EMS transport to [facility]. Pt made ED and  Provider aware of pt's refusal. Pt is 9.13 voluntary status for inpt psych.  provider informed SW that pt will be switched from 9.13 voluntary status to 9.27 involuntary status at this time. JESSICA completed AD N application and 2 PC legals with necessary providers. JESSICA called [facility] admissions and spoke to Keisha ([phone]). Keisha reports pt can still be transferred to [facility] as long as new involuntary legals are faxed over to [facility] at ([phone]). JESSICA faxed over legals as requested. JESSICA called [facility] ([phone]) and spoke to Jeannine to rearrange transport to [facility]. Transport arranged for next available.  Provider, ED Provider, and ED RN all in agreement with pt's transfer to [facility]. JESSICA placed involuntary legals into pt's transport packet. No further SW needs noted at this time. JESSICA signing off.

## 2025-07-18 ENCOUNTER — TRANSCRIPTION ENCOUNTER (OUTPATIENT)
Age: 75
End: 2025-07-18